# Patient Record
(demographics unavailable — no encounter records)

---

## 2025-03-17 NOTE — ASSESSMENT
[FreeTextEntry1] : Ms. MCGUIRE  is a 62-year-old female with a history of born in [place], former ~40 year smoker, lipoma of L elbow, vocal cord edema, overweight, LPR, hepatitis B, s/p hospitalization [facility] 4/2024 for "PNA"-SOB, COPD/Asthma, LPR/GERD, ?ESTELLE, Lung CA Screening, nicotine addiction   Her shortness of breath is multifactorial due to: -poor mechanics of breathing -out of shape -over weight -pulmonary disease   -COPD/Asthma -?cardiac disease   problem 1: COPD/Asthma -s/p Alpha 1 antitrypsin level (MS 98)- repeat 7/2025 -s/p blood work (all WNL): asthma panel, food IgE panel (not done), IgE level, eosinophil level, vitamin D level -continue Trelegy 200 1 puff QD -continue  mg BID -Inhaler technique reviewed as well as oral hygiene techniques reviewed with patient. Avoidance of cold air, extremes of temperature, rescue inhaler should be used before exercise. Order of medication reviewed with patient. Recommended use of a cool mist humidifier in the bedroom. - Asthma is believed to be caused by inherited (genetic) and environmental factor, but its exact cause is unknown.  - Asthma may be triggered by allergens, lung infections, or irritants in the air. Asthma triggers are different for each person -COPD is a progressive disease and although it can't be cured, appropriate management can slow its progression, reduce frequency and severity of exacerbations, and improve symptoms and the patient quality of life. Hospitalizations are the greatest contributor to the total COPD costs and account for up to 87% of total COPD related costs. Exacerbations are the main cause of admissions and subsequently account for the 40-75% of COPD costs. Inhaled maintenance therapy reduces the incidence of exacerbations in patients with stable COPD. Incorrect inhaler use and nonadherence are major obstacles to achieving COPD treatment goals. Many COPD patients have challenges (impaired inhalation, limited dexterity, reduced cognition: that limit their ability to correctly use their COPD treatment devices resulting in reduced symptom control. Of most importance is smoking cessation and early intervention with respiratory illnesses and contemplation for pulmonary rehab to enhance quality of life.   problem 2: LPR/GERD -continue Pepcid 40 mg QHS -Rule of 2s: avoid eating too much, eating too late, eating too spicy, eating two hours before bed. -Things to avoid including overeating, spicy foods, tight clothing, eating within three hours of bed, this list is not all inclusive. -For treatment of reflux, possible options discussed including diet control, H2 blockers, PPIs, as well as coating motility agents discussed as treatment options. Timing of meals and proximity of last meal to sleep were discussed. If symptoms persist, a formal gastrointestinal evaluation is needed.   Problem 3: Nicotine Addiction (~40 year smoker) - smoking cessation discussed 03/17/2025 Discussed for five minutes with the patient the risks/associations with continued smoking including COPD, emphysema, shortness of breath, renal cancer, bladder cancer, stroke risk, cardiac disease, etc. Smoking cessation was discussed at length and highly encouraged. Various options to aid cessation was discussed including use of Chantix, Nicotrol, nicotine products, laser therapy, hypnosis, Wellbutrin, etc   Problem 4: Lung CA Screening  -s/p Chest CT 6/2024- next 7/2025 The American Cancer Society now recommends that individuals aged 50 to 80 years who currently smoke, formerly smoked. and are at high risk for lung cancer because of a greater than 20 pack-year history of cigarette smoking undergo annual lung cancer screening with LDCT. The recommendations eliminated years since quitting as a criterium for evaluation for lung cancer screening.   Problem 5: ?ESTELLE (Risk factors: elevated Mallampati class, snoring, nocturia)  -get home sleep study ([place]) -Sleep apnea is associated with adverse clinical consequences which can affect most organ systems. Cardiovascular disease risk includes arrhythmias, atrial fibrillation, hypertension, coronary artery disease, and stroke. Metabolic disorders include diabetes type 2, non-alcoholic fatty liver disease. Mood disorder especially depression; and cognitive decline especially in the elderly. Associations with chronic reflux/Jarrod's esophagus some but now all inclusive. -Reasons include arousal consistent with hypopnea; respiratory events most prominent in REM sleep or supine position; therefore sleep staging and body position are important for accurate diagnosis and estimation of AHI.   problem 6: cardiac  -recommended to continue to follow up with Cardiologist (Abhi Orellana)   problem 7: poor breathing mechanics -Proper breathing techniques were reviewed with an emphasis of exhalation. Patient instructed to breath in for 1 second and out for four seconds. Patient was encouraged to not talk while walking.   problem 8: overweight / out of shape -recommended Berberine Synergy OTC -candidate for Zepbound if she has ESTELLE -Weight loss, exercise, and diet control were discussed and are highly encouraged. Treatment options are given such as, aqua therapy, and contacting a nutritionist. Recommended to use the elliptical, stationary bike, less use of treadmill.   problem 9: health maintenance -Hepatology: referral to Dr. David Moss -recommended yearly flu shot after October 15, 2024 -recommended strep pneumonia vaccines: Prevnar-20 vaccine, followed by Pneumo vaccine 23 one year following after 65 years old. -recommended early intervention for Upper Respiratory Infections (URIs) -recommended regular osteoporosis evaluations -recommended early dermatological evaluations -recommended after the age of 50 to the age of 70, colonoscopy every 5 years   F/P in 4 months. She is encouraged to call with any changes, concerns, or questions

## 2025-03-17 NOTE — PROCEDURE
[FreeTextEntry1] : Full PFT reveals normal flows; FEV1 was 2.26L which is 88% of predicted; normal lung volumes; normal diffusion at 22.81, which is 108% of predicted; normal flow volume loop. PFTs were performed to evaluate for SOB, asthma  FENO was 13; a normal value being less than 25 Fractional exhaled nitric oxide (FENO) is regarded as a simple, noninvasive method for assessing eosinophilic airway inflammation. Produced by a variety of cells within the lung, nitric oxide (NO) concentrations are generally low in healthy individuals. However, high concentrations of NO appear to be involved in nonspecific host defense mechanisms and chronic inflammatory diseases such as asthma. The American Thoracic Society (ATS) therefore has recommended using FENO to aid in the diagnosis and monitoring of eosinophilic airway inflammation and asthma, and for identifying steroid responsive individuals whose chronic respiratory symptoms may be caused by airway inflammation.

## 2025-03-17 NOTE — REASON FOR VISIT
[Follow-Up] : a follow-up visit [TextBox_44] : SOB, COPD/Asthma, LPR/GERD, ?ESTELLE, Lung CA Screening, nicotine addiction

## 2025-03-17 NOTE — ADDENDUM
[FreeTextEntry1] : Documented by Shante Patiño acting as a scribe for Dr. Ady Emmanuel on 03/17/2025. All medical record entries made by the Scribe were at my, Dr. Ady Emmanuel's, direction and personally dictated by me on 03/17/2025. I have reviewed the chart and agree that the record accurately reflects my personal performance of the history, physical exam, assessment and plan. I have also personally directed, reviewed, and agree with the discharge instructions.

## 2025-07-26 NOTE — HISTORY OF PRESENT ILLNESS
[Former] : Former [TextBox_13] : Patient is scheduled for a annual  LDCT for lung cancer screening. Chart review performed to confirm eligibility for LDCT for the purposes of [facility] lung cancer screening program.    No documented personal or family history of lung cancer. No documented s/s of lung cancer. Pt is a former smoker with a 20+ pack year hx. [PacksperYear] : 20